# Patient Record
Sex: FEMALE | Race: WHITE
[De-identification: names, ages, dates, MRNs, and addresses within clinical notes are randomized per-mention and may not be internally consistent; named-entity substitution may affect disease eponyms.]

---

## 2018-04-08 ENCOUNTER — HOSPITAL ENCOUNTER (EMERGENCY)
Age: 28
Discharge: HOME | End: 2018-04-08

## 2018-04-08 ENCOUNTER — HOSPITAL ENCOUNTER (EMERGENCY)
Dept: HOSPITAL 91 - FTE | Age: 28
Discharge: HOME | End: 2018-04-08
Payer: COMMERCIAL

## 2018-04-08 DIAGNOSIS — O20.0: Primary | ICD-10-CM

## 2018-04-08 DIAGNOSIS — R10.2: ICD-10-CM

## 2018-04-08 LAB
ADD MAN DIFF?: NO
ADD UMIC: YES
BASOPHIL #: 0 10^3/UL (ref 0–0.1)
BASOPHILS %: 0.5 % (ref 0–2)
EOSINOPHILS #: 0.1 10^3/UL (ref 0–0.5)
EOSINOPHILS %: 1.2 % (ref 0–7)
HEMATOCRIT: 40.3 % (ref 37–47)
HEMOGLOBIN: 13.5 G/DL (ref 12–16)
LYMPHOCYTES #: 1.3 10^3/UL (ref 0.8–2.9)
LYMPHOCYTES %: 32.8 % (ref 15–51)
MEAN CORPUSCULAR HEMOGLOBIN: 29.5 PG (ref 29–33)
MEAN CORPUSCULAR HGB CONC: 33.5 G/DL (ref 32–37)
MEAN CORPUSCULAR VOLUME: 88.2 FL (ref 82–101)
MEAN PLATELET VOLUME: 9.1 FL (ref 7.4–10.4)
MONOCYTE #: 0.4 10^3/UL (ref 0.3–0.9)
MONOCYTES %: 8.6 % (ref 0–11)
NEUTROPHIL #: 2.3 10^3/UL (ref 1.6–7.5)
NEUTROPHILS %: 56.4 % (ref 39–77)
NUCLEATED RED BLOOD CELLS #: 0 10^3/UL (ref 0–0)
NUCLEATED RED BLOOD CELLS%: 0 /100WBC (ref 0–0)
PLATELET COUNT: 227 10^3/UL (ref 140–415)
RED BLOOD COUNT: 4.57 10^6/UL (ref 4.2–5.4)
RED CELL DISTRIBUTION WIDTH: 12.3 % (ref 11.5–14.5)
UR ASCORBIC ACID: NEGATIVE MG/DL
UR BILIRUBIN (DIP): NEGATIVE MG/DL
UR BLOOD (DIP): (no result) MG/DL
UR CLARITY: CLEAR
UR COLOR: YELLOW
UR GLUCOSE (DIP): NEGATIVE MG/DL
UR KETONES (DIP): NEGATIVE MG/DL
UR LEUKOCYTE ESTERASE (DIP): NEGATIVE LEU/UL
UR MUCUS: (no result) /HPF
UR NITRITE (DIP): NEGATIVE MG/DL
UR PH (DIP): 6 (ref 5–9)
UR RBC: 1 /HPF (ref 0–5)
UR SPECIFIC GRAVITY (DIP): 1.01 (ref 1–1.03)
UR TOTAL PROTEIN (DIP): NEGATIVE MG/DL
UR UROBILINOGEN (DIP): NEGATIVE MG/DL
UR WBC: 0 /HPF (ref 0–5)
WHITE BLOOD COUNT: 4.1 10^3/UL (ref 4.8–10.8)

## 2018-04-08 PROCEDURE — 76801 OB US < 14 WKS SINGLE FETUS: CPT

## 2018-04-08 PROCEDURE — 36415 COLL VENOUS BLD VENIPUNCTURE: CPT

## 2018-04-08 PROCEDURE — 86901 BLOOD TYPING SEROLOGIC RH(D): CPT

## 2018-04-08 PROCEDURE — 81001 URINALYSIS AUTO W/SCOPE: CPT

## 2018-04-08 PROCEDURE — 99284 EMERGENCY DEPT VISIT MOD MDM: CPT

## 2018-04-08 PROCEDURE — 84702 CHORIONIC GONADOTROPIN TEST: CPT

## 2018-04-08 PROCEDURE — 81025 URINE PREGNANCY TEST: CPT

## 2018-04-08 PROCEDURE — 76817 TRANSVAGINAL US OBSTETRIC: CPT

## 2018-04-08 PROCEDURE — 85025 COMPLETE CBC W/AUTO DIFF WBC: CPT

## 2018-04-08 PROCEDURE — 86900 BLOOD TYPING SEROLOGIC ABO: CPT

## 2018-04-10 ENCOUNTER — HOSPITAL ENCOUNTER (OUTPATIENT)
Dept: HOSPITAL 91 - SDS | Age: 28
Discharge: HOME | End: 2018-04-10
Payer: COMMERCIAL

## 2018-04-10 ENCOUNTER — HOSPITAL ENCOUNTER (OUTPATIENT)
Age: 28
Discharge: HOME | End: 2018-04-10

## 2018-04-10 DIAGNOSIS — N83.202: Primary | ICD-10-CM

## 2018-04-10 DIAGNOSIS — E66.9: ICD-10-CM

## 2018-04-10 LAB
ADD MAN DIFF?: NO
ADD UMIC: YES
BASOPHIL #: 0 10^3/UL (ref 0–0.1)
BASOPHILS %: 0.2 % (ref 0–2)
EOSINOPHILS #: 0.1 10^3/UL (ref 0–0.5)
EOSINOPHILS %: 1.7 % (ref 0–7)
HEMATOCRIT: 41.5 % (ref 37–47)
HEMOGLOBIN: 13.6 G/DL (ref 12–16)
LYMPHOCYTES #: 1.6 10^3/UL (ref 0.8–2.9)
LYMPHOCYTES %: 30.6 % (ref 15–51)
MEAN CORPUSCULAR HEMOGLOBIN: 28.9 PG (ref 29–33)
MEAN CORPUSCULAR HGB CONC: 32.8 G/DL (ref 32–37)
MEAN CORPUSCULAR VOLUME: 88.1 FL (ref 82–101)
MEAN PLATELET VOLUME: 9.2 FL (ref 7.4–10.4)
MONOCYTE #: 0.4 10^3/UL (ref 0.3–0.9)
MONOCYTES %: 6.6 % (ref 0–11)
NEUTROPHIL #: 3.2 10^3/UL (ref 1.6–7.5)
NEUTROPHILS %: 60.7 % (ref 39–77)
NUCLEATED RED BLOOD CELLS #: 0 10^3/UL (ref 0–0)
NUCLEATED RED BLOOD CELLS%: 0 /100WBC (ref 0–0)
PLATELET COUNT: 225 10^3/UL (ref 140–415)
RED BLOOD COUNT: 4.71 10^6/UL (ref 4.2–5.4)
RED CELL DISTRIBUTION WIDTH: 12.6 % (ref 11.5–14.5)
UR ASCORBIC ACID: NEGATIVE MG/DL
UR BACTERIA: (no result) /HPF
UR BILIRUBIN (DIP): NEGATIVE MG/DL
UR BLOOD (DIP): (no result) MG/DL
UR CLARITY: (no result)
UR COLOR: YELLOW
UR GLUCOSE (DIP): NEGATIVE MG/DL
UR KETONES (DIP): NEGATIVE MG/DL
UR LEUKOCYTE ESTERASE (DIP): NEGATIVE LEU/UL
UR NITRITE (DIP): NEGATIVE MG/DL
UR PH (DIP): 8 (ref 5–9)
UR RBC: 1 /HPF (ref 0–5)
UR SPECIFIC GRAVITY (DIP): 1.01 (ref 1–1.03)
UR SQUAMOUS EPITHELIAL CELL: (no result) /HPF
UR TOTAL PROTEIN (DIP): NEGATIVE MG/DL
UR UROBILINOGEN (DIP): NEGATIVE MG/DL
UR WBC: 1 /HPF (ref 0–5)
WHITE BLOOD COUNT: 5.3 10^3/UL (ref 4.8–10.8)

## 2018-04-10 PROCEDURE — 76817 TRANSVAGINAL US OBSTETRIC: CPT

## 2018-04-10 PROCEDURE — 86901 BLOOD TYPING SEROLOGIC RH(D): CPT

## 2018-04-10 PROCEDURE — 81001 URINALYSIS AUTO W/SCOPE: CPT

## 2018-04-10 PROCEDURE — 99285 EMERGENCY DEPT VISIT HI MDM: CPT

## 2018-04-10 PROCEDURE — 96375 TX/PRO/DX INJ NEW DRUG ADDON: CPT

## 2018-04-10 PROCEDURE — 96374 THER/PROPH/DIAG INJ IV PUSH: CPT

## 2018-04-10 PROCEDURE — 36415 COLL VENOUS BLD VENIPUNCTURE: CPT

## 2018-04-10 PROCEDURE — 88104 CYTOPATH FL NONGYN SMEARS: CPT

## 2018-04-10 PROCEDURE — 86900 BLOOD TYPING SEROLOGIC ABO: CPT

## 2018-04-10 PROCEDURE — 76801 OB US < 14 WKS SINGLE FETUS: CPT

## 2018-04-10 PROCEDURE — 84702 CHORIONIC GONADOTROPIN TEST: CPT

## 2018-04-10 PROCEDURE — 88305 TISSUE EXAM BY PATHOLOGIST: CPT

## 2018-04-10 PROCEDURE — 58120 DILATION AND CURETTAGE: CPT

## 2018-04-10 PROCEDURE — 85025 COMPLETE CBC W/AUTO DIFF WBC: CPT

## 2018-04-10 RX ADMIN — HYDROMORPHONE HYDROCHLORIDE 1 MG: 2 INJECTION INTRAMUSCULAR; INTRAVENOUS; SUBCUTANEOUS at 13:37

## 2018-04-10 RX ADMIN — HYDROMORPHONE HYDROCHLORIDE 1 MG: 2 INJECTION INTRAMUSCULAR; INTRAVENOUS; SUBCUTANEOUS at 13:32

## 2018-04-10 RX ADMIN — ONDANSETRON HYDROCHLORIDE 1 MG: 2 INJECTION, SOLUTION INTRAMUSCULAR; INTRAVENOUS at 13:32

## 2018-04-10 RX ADMIN — HYDROMORPHONE HYDROCHLORIDE 1 MG: 2 INJECTION INTRAMUSCULAR; INTRAVENOUS; SUBCUTANEOUS at 13:55

## 2018-09-12 ENCOUNTER — HOSPITAL ENCOUNTER (EMERGENCY)
Age: 28
Discharge: HOME | End: 2018-09-12

## 2018-09-12 ENCOUNTER — HOSPITAL ENCOUNTER (EMERGENCY)
Dept: HOSPITAL 91 - FTE | Age: 28
Discharge: HOME | End: 2018-09-12
Payer: COMMERCIAL

## 2018-09-12 DIAGNOSIS — O20.0: Primary | ICD-10-CM

## 2018-09-12 DIAGNOSIS — Z3A.09: ICD-10-CM

## 2018-09-12 LAB
ADD MAN DIFF?: NO
ADD UMIC: YES
BASOPHIL #: 0 10^3/UL (ref 0–0.1)
BASOPHILS %: 0.2 % (ref 0–2)
EOSINOPHILS #: 0.1 10^3/UL (ref 0–0.5)
EOSINOPHILS %: 2.1 % (ref 0–7)
HEMATOCRIT: 42.6 % (ref 37–47)
HEMOGLOBIN: 14.1 G/DL (ref 12–16)
IMMATURE GRANS #M: 0.01 10^3/UL (ref 0–0.03)
IMMATURE GRANS % (M): 0.2 % (ref 0–0.43)
LYMPHOCYTES #: 1.2 10^3/UL (ref 0.8–2.9)
LYMPHOCYTES %: 25.5 % (ref 15–51)
MEAN CORPUSCULAR HEMOGLOBIN: 29.3 PG (ref 29–33)
MEAN CORPUSCULAR HGB CONC: 33.1 G/DL (ref 32–37)
MEAN CORPUSCULAR VOLUME: 88.6 FL (ref 82–101)
MEAN PLATELET VOLUME: 9.3 FL (ref 7.4–10.4)
MONOCYTE #: 0.3 10^3/UL (ref 0.3–0.9)
MONOCYTES %: 6.3 % (ref 0–11)
NEUTROPHIL #: 3.1 10^3/UL (ref 1.6–7.5)
NEUTROPHILS %: 65.7 % (ref 39–77)
NUCLEATED RED BLOOD CELLS #: 0 10^3/UL (ref 0–0)
NUCLEATED RED BLOOD CELLS%: 0 /100WBC (ref 0–0)
PLATELET COUNT: 226 10^3/UL (ref 140–415)
RED BLOOD COUNT: 4.81 10^6/UL (ref 4.2–5.4)
RED CELL DISTRIBUTION WIDTH: 13.2 % (ref 11.5–14.5)
UR ASCORBIC ACID: NEGATIVE MG/DL
UR BACTERIA: (no result) /HPF
UR BILIRUBIN (DIP): NEGATIVE MG/DL
UR BLOOD (DIP): (no result) MG/DL
UR CLARITY: CLEAR
UR COLOR: (no result)
UR GLUCOSE (DIP): NEGATIVE MG/DL
UR KETONES (DIP): NEGATIVE MG/DL
UR LEUKOCYTE ESTERASE (DIP): NEGATIVE LEU/UL
UR NITRITE (DIP): NEGATIVE MG/DL
UR PH (DIP): 7 (ref 5–9)
UR RBC: 1 /HPF (ref 0–5)
UR SPECIFIC GRAVITY (DIP): 1 (ref 1–1.03)
UR SQUAMOUS EPITHELIAL CELL: (no result) /HPF
UR TOTAL PROTEIN (DIP): NEGATIVE MG/DL
UR UROBILINOGEN (DIP): NEGATIVE MG/DL
UR WBC: 0 /HPF (ref 0–5)
WHITE BLOOD COUNT: 4.7 10^3/UL (ref 4.8–10.8)

## 2018-09-12 PROCEDURE — 81001 URINALYSIS AUTO W/SCOPE: CPT

## 2018-09-12 PROCEDURE — 86900 BLOOD TYPING SEROLOGIC ABO: CPT

## 2018-09-12 PROCEDURE — 76801 OB US < 14 WKS SINGLE FETUS: CPT

## 2018-09-12 PROCEDURE — 85025 COMPLETE CBC W/AUTO DIFF WBC: CPT

## 2018-09-12 PROCEDURE — 99284 EMERGENCY DEPT VISIT MOD MDM: CPT

## 2018-09-12 PROCEDURE — 86901 BLOOD TYPING SEROLOGIC RH(D): CPT

## 2018-09-12 PROCEDURE — 36415 COLL VENOUS BLD VENIPUNCTURE: CPT

## 2018-09-12 PROCEDURE — 84702 CHORIONIC GONADOTROPIN TEST: CPT

## 2018-09-15 ENCOUNTER — HOSPITAL ENCOUNTER (OUTPATIENT)
Age: 28
Discharge: HOME | End: 2018-09-15

## 2018-09-15 ENCOUNTER — HOSPITAL ENCOUNTER (OUTPATIENT)
Dept: HOSPITAL 91 - FTE | Age: 28
Discharge: HOME | End: 2018-09-15
Payer: COMMERCIAL

## 2018-09-15 DIAGNOSIS — O02.1: Primary | ICD-10-CM

## 2018-09-15 LAB
ADD MAN DIFF?: NO
BASOPHIL #: 0 10^3/UL (ref 0–0.1)
BASOPHILS %: 0.4 % (ref 0–2)
EOSINOPHILS #: 0.1 10^3/UL (ref 0–0.5)
EOSINOPHILS %: 1.6 % (ref 0–7)
HEMATOCRIT: 40.4 % (ref 37–47)
HEMOGLOBIN: 13.6 G/DL (ref 12–16)
IMMATURE GRANS #M: 0.02 10^3/UL (ref 0–0.03)
IMMATURE GRANS % (M): 0.4 % (ref 0–0.43)
LYMPHOCYTES #: 1.2 10^3/UL (ref 0.8–2.9)
LYMPHOCYTES %: 21.4 % (ref 15–51)
MEAN CORPUSCULAR HEMOGLOBIN: 29.5 PG (ref 29–33)
MEAN CORPUSCULAR HGB CONC: 33.7 G/DL (ref 32–37)
MEAN CORPUSCULAR VOLUME: 87.6 FL (ref 82–101)
MEAN PLATELET VOLUME: 9.3 FL (ref 7.4–10.4)
MONOCYTE #: 0.3 10^3/UL (ref 0.3–0.9)
MONOCYTES %: 5.9 % (ref 0–11)
NEUTROPHIL #: 3.9 10^3/UL (ref 1.6–7.5)
NEUTROPHILS %: 70.3 % (ref 39–77)
NUCLEATED RED BLOOD CELLS #: 0 10^3/UL (ref 0–0)
NUCLEATED RED BLOOD CELLS%: 0 /100WBC (ref 0–0)
PLATELET COUNT: 216 10^3/UL (ref 140–415)
RED BLOOD COUNT: 4.61 10^6/UL (ref 4.2–5.4)
RED CELL DISTRIBUTION WIDTH: 13.3 % (ref 11.5–14.5)
WHITE BLOOD COUNT: 5.6 10^3/UL (ref 4.8–10.8)

## 2018-09-15 PROCEDURE — 99285 EMERGENCY DEPT VISIT HI MDM: CPT

## 2018-09-15 PROCEDURE — 88305 TISSUE EXAM BY PATHOLOGIST: CPT

## 2018-09-15 PROCEDURE — 59820 CARE OF MISCARRIAGE: CPT

## 2018-09-15 PROCEDURE — 76817 TRANSVAGINAL US OBSTETRIC: CPT

## 2018-09-15 PROCEDURE — 85025 COMPLETE CBC W/AUTO DIFF WBC: CPT

## 2018-09-15 PROCEDURE — 84702 CHORIONIC GONADOTROPIN TEST: CPT

## 2018-09-15 PROCEDURE — 86901 BLOOD TYPING SEROLOGIC RH(D): CPT

## 2018-09-15 PROCEDURE — 86900 BLOOD TYPING SEROLOGIC ABO: CPT

## 2018-09-15 PROCEDURE — 76801 OB US < 14 WKS SINGLE FETUS: CPT

## 2019-01-07 ENCOUNTER — HOSPITAL ENCOUNTER (EMERGENCY)
Dept: HOSPITAL 91 - FTE | Age: 29
Discharge: HOME | End: 2019-01-07
Payer: COMMERCIAL

## 2019-01-07 ENCOUNTER — HOSPITAL ENCOUNTER (EMERGENCY)
Dept: HOSPITAL 10 - FTE | Age: 29
Discharge: HOME | End: 2019-01-07
Payer: COMMERCIAL

## 2019-01-07 VITALS
WEIGHT: 181 LBS | BODY MASS INDEX: 29.09 KG/M2 | HEIGHT: 66 IN | BODY MASS INDEX: 29.09 KG/M2 | HEIGHT: 66 IN | WEIGHT: 181 LBS

## 2019-01-07 DIAGNOSIS — R10.30: Primary | ICD-10-CM

## 2019-01-07 LAB
ADD MAN DIFF?: NO
ADD UMIC: YES
ALANINE AMINOTRANSFERASE: 37 IU/L (ref 13–69)
ALBUMIN/GLOBULIN RATIO: 1.1
ALBUMIN: 4.3 G/DL (ref 3.3–4.9)
ALKALINE PHOSPHATASE: 61 IU/L (ref 42–121)
ANION GAP: 8 (ref 5–13)
ASPARTATE AMINO TRANSFERASE: 27 IU/L (ref 15–46)
BASOPHIL #: 0 10^3/UL (ref 0–0.1)
BASOPHILS %: 0.5 % (ref 0–2)
BILIRUBIN,DIRECT: 0 MG/DL (ref 0–0.2)
BILIRUBIN,TOTAL: 0 MG/DL (ref 0.2–1.3)
BLOOD UREA NITROGEN: 10 MG/DL (ref 7–20)
CALCIUM: 9.5 MG/DL (ref 8.4–10.2)
CARBON DIOXIDE: 24 MMOL/L (ref 21–31)
CHLORIDE: 108 MMOL/L (ref 97–110)
CREATININE: 0.61 MG/DL (ref 0.44–1)
EOSINOPHILS #: 0.9 10^3/UL (ref 0–0.5)
EOSINOPHILS %: 16 % (ref 0–7)
GLOBULIN: 3.9 G/DL (ref 1.3–3.2)
GLUCOSE: 97 MG/DL (ref 70–220)
HEMATOCRIT: 42.7 % (ref 37–47)
HEMOGLOBIN: 14.1 G/DL (ref 12–16)
IMMATURE GRANS #M: 0.01 10^3/UL (ref 0–0.03)
IMMATURE GRANS % (M): 0.2 % (ref 0–0.43)
LIPASE: 79 U/L (ref 23–300)
LYMPHOCYTES #: 1.4 10^3/UL (ref 0.8–2.9)
LYMPHOCYTES %: 24 % (ref 15–51)
MEAN CORPUSCULAR HEMOGLOBIN: 28.9 PG (ref 29–33)
MEAN CORPUSCULAR HGB CONC: 33 G/DL (ref 32–37)
MEAN CORPUSCULAR VOLUME: 87.5 FL (ref 82–101)
MEAN PLATELET VOLUME: 9.5 FL (ref 7.4–10.4)
MONOCYTE #: 0.4 10^3/UL (ref 0.3–0.9)
MONOCYTES %: 6.2 % (ref 0–11)
NEUTROPHIL #: 3 10^3/UL (ref 1.6–7.5)
NEUTROPHILS %: 53.1 % (ref 39–77)
NUCLEATED RED BLOOD CELLS #: 0 10^3/UL (ref 0–0)
NUCLEATED RED BLOOD CELLS%: 0 /100WBC (ref 0–0)
PLATELET COUNT: 254 10^3/UL (ref 140–415)
POTASSIUM: 4.1 MMOL/L (ref 3.5–5.1)
RED BLOOD COUNT: 4.88 10^6/UL (ref 4.2–5.4)
RED CELL DISTRIBUTION WIDTH: 12.3 % (ref 11.5–14.5)
SODIUM: 140 MMOL/L (ref 135–144)
TOTAL PROTEIN: 8.2 G/DL (ref 6.1–8.1)
UR ASCORBIC ACID: NEGATIVE MG/DL
UR BACTERIA: (no result) /HPF
UR BILIRUBIN (DIP): NEGATIVE MG/DL
UR BLOOD (DIP): (no result) MG/DL
UR CLARITY: (no result)
UR COLOR: YELLOW
UR GLUCOSE (DIP): NEGATIVE MG/DL
UR KETONES (DIP): NEGATIVE MG/DL
UR LEUKOCYTE ESTERASE (DIP): NEGATIVE LEU/UL
UR NITRITE (DIP): NEGATIVE MG/DL
UR PH (DIP): 5 (ref 5–9)
UR RBC: > 182 /HPF (ref 0–5)
UR SPECIFIC GRAVITY (DIP): 1.01 (ref 1–1.03)
UR SQUAMOUS EPITHELIAL CELL: (no result) /HPF
UR TOTAL PROTEIN (DIP): (no result) MG/DL
UR UROBILINOGEN (DIP): NEGATIVE MG/DL
UR WBC: 14 /HPF (ref 0–5)
WHITE BLOOD COUNT: 5.6 10^3/UL (ref 4.8–10.8)

## 2019-01-07 PROCEDURE — 85025 COMPLETE CBC W/AUTO DIFF WBC: CPT

## 2019-01-07 PROCEDURE — 36415 COLL VENOUS BLD VENIPUNCTURE: CPT

## 2019-01-07 PROCEDURE — 83690 ASSAY OF LIPASE: CPT

## 2019-01-07 PROCEDURE — 76856 US EXAM PELVIC COMPLETE: CPT

## 2019-01-07 PROCEDURE — 80053 COMPREHEN METABOLIC PANEL: CPT

## 2019-01-07 PROCEDURE — 81001 URINALYSIS AUTO W/SCOPE: CPT

## 2019-01-07 PROCEDURE — 81025 URINE PREGNANCY TEST: CPT

## 2019-01-07 PROCEDURE — 99284 EMERGENCY DEPT VISIT MOD MDM: CPT

## 2019-01-07 RX ADMIN — ALUMINUM HYDROXIDE, MAGNESIUM HYDROXIDE, DIMETHICONE 1 ML: 200; 200; 20 SUSPENSION ORAL at 13:00

## 2019-01-07 NOTE — ERD
ER Documentation


Chief Complaint


Chief Complaint





Complains of abdominal apin x 3 days





HPI


28-year-old female presents for abdominal pain times 3 days.  Pain is noted to 


be in the umbilical area.  He is known to be intermittent.  Pain is 


nonradiating, rated 4 out of 10.  No prior similar symptoms.  She states that 


she had a right ovarian cyst surgery about a year ago.  She is currently on her 


menstrual period.  She also complains of low back pain.  Low back pain is noted 


to be mild and nonradiating.  She denies any nausea, vomiting, diarrhea.  She 


denies any fevers or chills.  She also denies dysuria.  She notes that the pain 


is worse with eating.





ROS


All systems reviewed and are negative except as per history of present illness.





Medications


Home Meds


Active Scripts


Ciprofloxacin Hcl* (Ciprofloxacin Hcl*) 500 Mg Tablet, 500 MG PO BID for UTI for


5 Days, #10 TAB


   Prov:JONN PARRA DO         1/7/19


Magaldrate/Simethicone* (Mylanta*) 355 Ml Susp, 30 ML PO QID PRN for 


GASTROINTESTINAL UPSET, #1 BOTTLE


   Prov:JONN PARRA          1/7/19


Acetaminophen (Tylenol) 500 Mg Tab, 500 MG PO Q4H PRN for PAIN, #30 TAB


   Prov:JONN PARRA          1/7/19


Reported Medications


Ferrous Sulfate* (Ferrous Sulfate*) 325 Mg Tabec, 325 MG PO DAILY, TAB


   9/15/18





Allergies


Allergies:  


Coded Allergies:  


     No Known Allergy (Unverified , 9/15/18)





PMhx/Soc


History of Surgery:  Yes (ovarian surgery)


Anesthesia Reaction:  No


Hx Neurological Disorder:  No


Hx Respiratory Disorders:  No


Hx Cardiac Disorders:  No


Hx Psychiatric Problems:  No


Hx Miscellaneous Medical Probl:  No


Hx Alcohol Use:  No


Hx Substance Use:  No


Hx Tobacco Use:  No





Physical Exam


Vitals





Vital Signs


  Date      Temp  Pulse  Resp  B/P (MAP)   Pulse Ox  O2          O2 Flow    FiO2


Time                                                 Delivery    Rate


    1/7/19  97.6     82    20      120/73        98


     10:40                           (89)





Physical Exam


Const:   No acute distress


Resp:   Clear to auscultation bilaterally


Cardio:   Regular rate and rhythm, no murmurs, bilateral dorsalis pedis pulses 


intact


Abd:    Soft, non distended. Normal bowel sounds, mild epigastric tenderness to 


palpation, no McBurney's point tenderness, no Brown sign, no rebound or 


guarding noted.


Skin:   No petechiae or rashes


Back:   Lumbar paravertebral muscle tenderness palpation, no midline tenderness,


no flank pain.


Ext:    No cyanosis, or edema


Neur:   Awake and alert, bilateral lower extremity sensation intact


Psych:    Normal Mood and Affect


Result Diagram:  


1/7/19 1235                                                                     


          1/7/19 1235





Results 24 hrs





Laboratory Tests


Test
                              1/7/19
12:28    1/7/19
12:35     1/7/19
12:38


POC Beta HCG, Qualitative          NEGATIVE


White Blood Count                                  5.6 10^3/ul


Red Blood Count                                   4.88 10^6/ul


Hemoglobin                                           14.1 g/dl


Hematocrit                                              42.7 %


Mean Corpuscular Volume                                87.5 fl


Mean Corpuscular Hemoglobin                            28.9 pg


Mean Corpuscular                   
                33.0 g/dl 
  



Hemoglobin
Concent


Red Cell Distribution Width                             12.3 %


Platelet Count                                     254 10^3/UL


Mean Platelet Volume                                    9.5 fl


Immature Granulocytes %                                0.200 %


Neutrophils %                                           53.1 %


Lymphocytes %                                           24.0 %


Monocytes %                                              6.2 %


Eosinophils %                                           16.0 %


Basophils %                                              0.5 %


Nucleated Red Blood Cells %                        0.0 /100WBC


Immature Granulocytes #                          0.010 10^3/ul


Neutrophils #                                      3.0 10^3/ul


Lymphocytes #                                      1.4 10^3/ul


Monocytes #                                        0.4 10^3/ul


Eosinophils #                                      0.9 10^3/ul


Basophils #                                        0.0 10^3/ul


Nucleated Red Blood Cells #                        0.0 10^3/ul


Sodium Level                                        140 mmol/L


Potassium Level                                     4.1 mmol/L


Chloride Level                                      108 mmol/L


Carbon Dioxide Level                                 24 mmol/L


Anion Gap                                                    8


Blood Urea Nitrogen                                   10 mg/dl


Creatinine                                          0.61 mg/dl


Est Glomerular Filtrat             
             > 60 mL/min 
   



Rate
mL/min


Glucose Level                                         97 mg/dl


Calcium Level                                        9.5 mg/dl


Total Bilirubin                                      0.0 mg/dl


Direct Bilirubin                                    0.00 mg/dl


Indirect Bilirubin                                   0.0 mg/dl


Aspartate Amino Transf
(AST/SGOT)  
                  27 IU/L 
  



Alanine                            
                  37 IU/L 
  



Aminotransferase
(ALT/SGPT)


Alkaline Phosphatase                                   61 IU/L


Total Protein                                         8.2 g/dl


Albumin                                               4.3 g/dl


Globulin                                             3.90 g/dl


Albumin/Globulin Ratio                                    1.10


Lipase                                                  79 U/L


Urine Color                                                      YELLOW


Urine Clarity
                     
             
               SLIGHTLY
CLOUDY


Urine pH                                                                    5.0


Urine Specific Gravity                                                    1.011


Urine Ketones                                                    NEGATIVE mg/dL


Urine Nitrite                                                    NEGATIVE mg/dL


Urine Bilirubin                                                  NEGATIVE mg/dL


Urine Urobilinogen                                               NEGATIVE mg/dL


Urine Leukocyte Esterase
          
             
               NEGATIVE
Salo/ul


Urine Microscopic RBC                                            > 182 /HPF


Urine Microscopic WBC                                                   14 /HPF


Urine Squamous Epithelial
Cells    
             
               FEW /HPF 



Urine Bacteria                                                   FEW /HPF


Urine Hemoglobin                                                       3+ mg/dL


Urine Glucose                                                    NEGATIVE mg/dL


Urine Total Protein                                                    1+ mg/dl





Current Medications


 Medications
   Dose
          Sig/Christine
       Start Time
   Status  Last


 (Trade)       Ordered        Route
 PRN     Stop Time              Admin
Dose


                              Reason                                Admin


                40 ml          ONCE  STAT
    1/7/19        DC            1/7/19


Miscellaneous                 PO
            12:23
 1/7/19                13:00




 Medication
                                12:25


 (Gi Cocktail


(2))








Procedures/MDM


Medical Decision Making:





Differential diagnosis includes but not limited to acute gastroenteritis, 


appendicitis, cholecystitis, pancreatitis, ruptured ovarian cyst, acute 


gastritis, UTI.





Patient appeared well on physical exam. Nontoxic appearing.


Abdominal examination relatively benign.  There is low suspicion for an acute 


abdomen.





Given history of ovarian cyst a pelvic ultrasound was done.





Imaging:


Pelvic ultrasound showed small amount of free fluid in the cul-de-sac.





CBC showed no WBC elevation to suggest systemic infection, no anemia.


CMP showed normal electrolytes, normal renal and liver function.


UA consistent with a urinary tract infection.  There was a lot of RBCs which cou


ld be due to patient's current menstrual period.


Urine pregnancy test was negative.





ED course:


Patient was given GI cocktail.


Symptoms improved with treatment.





Prescription(s):


Patient given prescription for Tylenol and Mylanta, Cipro.





Given ultrasound finding patient may have had a ruptured ovarian cyst.


There is also possibility the patient may have acute gastritis given pain 


worsens with eating.


Given UA finding of elevated WBC patient and low back pain, there is possibility


of pyelonephritis.  Given prescription for Cipro.





Patient advised to continue with Advil at home for pain.  Advised that she may 


need OB/GYN follow-up.





Patient advised to follow up with PCP in 1-2 days. Patient advised to return to 


ED for new or worsening symptoms. Patient stable on discharge from the ED.








Disclaimer: Inadvertent spelling and grammatical errors are likely due to 


EHR/dictation software use and do not reflect on the overall quality of patient 


care. Also, please note that the electronic time recorded on this note does not 


necessarily reflect the actual time of the patient encounter.





Departure


Diagnosis:  


   Primary Impression:  


   Abdominal pain


   Abdominal location:  lower abdomen, unspecified  Qualified Codes:  R10.30 - 


   Lower abdominal pain, unspecified


Condition:  Fair


Patient Instructions:  Abdominal Pain


Referrals:  


[facility]


YOU HAVE RECEIVED A MEDICAL SCREENING EXAM AND THE RESULTS INDICATE THAT YOU DO 


NOT HAVE A CONDITION THAT REQUIRES URGENT TREATMENT IN THE EMERGENCY DEPARTMENT.





FURTHER EVALUATION AND TREATMENT OF YOUR CONDITION CAN WAIT UNTIL YOU ARE SEEN 


IN YOUR DOCTORS OFFICE WITHIN THE NEXT 1-2 DAYS. IT IS YOUR RESPONSIBILITY TO 


MAKE AN APPOINTMENT FOR FOLOW-UP CARE.





IF YOU HAVE A PRIMARY DOCTOR


--you should call your primary doctor and schedule an appointment





IF YOU DO NOT HAVE A PRIMARY DOCTOR YOU CAN CALL OUR PHYSICIAN REFERRAL HOTLINE 


AT


 [phone] 





IF YOU CAN NOT AFFORD TO SEE A PHYSICIAN YOU CAN CHOSE FROM THE FOLLOWING 


[facility]





[facility] [phone] [address] [facility]. [facility] [phone] [address] [facility]. [facility] [phone] [address] [facility] [phone] [address] [facility]. [facility] [phone] [address] [facility]. [facility]. [phone]


[address] [place]








OB/GYN REFERRAL LIST


DEBO MEADE MD


[address] [facility] 


[address] [place], [place] [id number] [phone] OFFICE FAX [phone]





RELL ELLIOTT


[address] [place], [place] [id number] [phone]





DR. [place]


[address] [place], [place] [id number] [phone]





LEILANI ANTUNEZ


[address] [facility], [address] [address]


[phone]





BLADIMIR FLYNN


[address] [facility], [place], [place] [id number] [phone]





[facility]


[address] [place], [place] [address] [phone] [address] [facility] [id number] [phone]  -  [phone]





DR RICE, DZE


[address], [facility] [address]


[phone]





DR HOWELL, JUAN LUIS


[address] [facility]. [address], [facility] [address]


[phone]





DR RICHMOND, FARID


[address] [place] [address] [phone]





Additional Instructions:  


Llame al doctor MAANA y aries tavon DIDIER PARA DENTRO DE 1-2 PAINTING.Dgale a la 


secretaria que nosotros le instruimos hacer esta didier.Avise o llame si anne cond


icin se empeora antes de la didier. Regresa aqui si peor o no mejor.











JONN PARRA DO                  Jan 7, 2019 13:33